# Patient Record
Sex: MALE
[De-identification: names, ages, dates, MRNs, and addresses within clinical notes are randomized per-mention and may not be internally consistent; named-entity substitution may affect disease eponyms.]

---

## 2019-08-21 ENCOUNTER — APPOINTMENT (OUTPATIENT)
Dept: VASCULAR SURGERY | Facility: CLINIC | Age: 64
End: 2019-08-21
Payer: MEDICAID

## 2019-08-21 PROCEDURE — 99203 OFFICE O/P NEW LOW 30 MIN: CPT

## 2019-08-22 PROBLEM — Z00.00 ENCOUNTER FOR PREVENTIVE HEALTH EXAMINATION: Status: ACTIVE | Noted: 2019-08-22

## 2019-10-03 DIAGNOSIS — I73.9 PERIPHERAL VASCULAR DISEASE, UNSPECIFIED: ICD-10-CM

## 2019-10-16 VITALS
WEIGHT: 162.26 LBS | OXYGEN SATURATION: 98 % | HEART RATE: 93 BPM | TEMPERATURE: 98 F | HEIGHT: 64 IN | RESPIRATION RATE: 18 BRPM | DIASTOLIC BLOOD PRESSURE: 73 MMHG | SYSTOLIC BLOOD PRESSURE: 153 MMHG

## 2019-10-16 NOTE — ASU PATIENT PROFILE, ADULT - PSH
Elective surgery  left ear surgery  S/P LASIK surgery  left eye Elective surgery  left ear surgery  H/O circumcision  age 43  S/P LASIK surgery  left eye

## 2019-10-16 NOTE — ASU PATIENT PROFILE, ADULT - PMH
Diabetes    GERD (gastroesophageal reflux disease)    Neuropathy  both feet Claudication of both lower extremities    Diabetes    GERD (gastroesophageal reflux disease)

## 2019-10-16 NOTE — ASU PREOP CHECKLIST - SELECT TESTS ORDERED
Doplar lower extremity/BMP/CBC/PT/PTT/INR/EKG Dopplar lower extremity/POCT Blood Glucose/CBC/INR/EKG/PT/PTT/BMP

## 2019-10-17 ENCOUNTER — APPOINTMENT (OUTPATIENT)
Dept: VASCULAR SURGERY | Facility: HOSPITAL | Age: 64
End: 2019-10-17

## 2019-10-17 ENCOUNTER — OUTPATIENT (OUTPATIENT)
Dept: OUTPATIENT SERVICES | Facility: HOSPITAL | Age: 64
LOS: 1 days | Discharge: HOME | End: 2019-10-17
Payer: MEDICAID

## 2019-10-17 VITALS
SYSTOLIC BLOOD PRESSURE: 127 MMHG | DIASTOLIC BLOOD PRESSURE: 71 MMHG | RESPIRATION RATE: 20 BRPM | HEART RATE: 88 BPM | OXYGEN SATURATION: 100 %

## 2019-10-17 DIAGNOSIS — Z98.890 OTHER SPECIFIED POSTPROCEDURAL STATES: Chronic | ICD-10-CM

## 2019-10-17 DIAGNOSIS — Z41.9 ENCOUNTER FOR PROCEDURE FOR PURPOSES OTHER THAN REMEDYING HEALTH STATE, UNSPECIFIED: Chronic | ICD-10-CM

## 2019-10-17 LAB
GLUCOSE BLDC GLUCOMTR-MCNC: 110 MG/DL — HIGH (ref 70–99)
GLUCOSE BLDC GLUCOMTR-MCNC: 77 MG/DL — SIGNIFICANT CHANGE UP (ref 70–99)
GLUCOSE BLDC GLUCOMTR-MCNC: 90 MG/DL — SIGNIFICANT CHANGE UP (ref 70–99)
GLUCOSE BLDC GLUCOMTR-MCNC: 97 MG/DL — SIGNIFICANT CHANGE UP (ref 70–99)

## 2019-10-17 PROCEDURE — 76000 FLUOROSCOPY <1 HR PHYS/QHP: CPT

## 2019-10-17 PROCEDURE — 37228: CPT | Mod: GC

## 2019-10-17 PROCEDURE — 82962 GLUCOSE BLOOD TEST: CPT

## 2019-10-17 PROCEDURE — C1894: CPT

## 2019-10-17 PROCEDURE — C1887: CPT

## 2019-10-17 PROCEDURE — C1889: CPT

## 2019-10-17 PROCEDURE — 75710 ARTERY X-RAYS ARM/LEG: CPT

## 2019-10-17 PROCEDURE — C1769: CPT

## 2019-10-17 PROCEDURE — C1725: CPT

## 2019-10-17 PROCEDURE — 37228: CPT | Mod: RT

## 2019-10-17 RX ORDER — SODIUM CHLORIDE 9 MG/ML
1000 INJECTION INTRAMUSCULAR; INTRAVENOUS; SUBCUTANEOUS
Refills: 0 | Status: DISCONTINUED | OUTPATIENT
Start: 2019-10-17 | End: 2019-10-17

## 2019-10-17 RX ORDER — ACETAMINOPHEN 500 MG
1000 TABLET ORAL EVERY 6 HOURS
Refills: 0 | Status: DISCONTINUED | OUTPATIENT
Start: 2019-10-17 | End: 2019-10-17

## 2019-10-17 RX ORDER — ATORVASTATIN CALCIUM 80 MG/1
1 TABLET, FILM COATED ORAL
Qty: 90 | Refills: 0
Start: 2019-10-17 | End: 2020-01-14

## 2019-10-17 RX ORDER — ASPIRIN/CALCIUM CARB/MAGNESIUM 324 MG
81 TABLET ORAL ONCE
Refills: 0 | Status: COMPLETED | OUTPATIENT
Start: 2019-10-17 | End: 2019-10-17

## 2019-10-17 RX ORDER — CLOPIDOGREL BISULFATE 75 MG/1
1 TABLET, FILM COATED ORAL
Qty: 90 | Refills: 0
Start: 2019-10-17 | End: 2020-01-14

## 2019-10-17 RX ORDER — INSULIN LISPRO 100/ML
VIAL (ML) SUBCUTANEOUS
Refills: 0 | Status: DISCONTINUED | OUTPATIENT
Start: 2019-10-17 | End: 2019-10-17

## 2019-10-17 RX ORDER — CLOPIDOGREL BISULFATE 75 MG/1
75 TABLET, FILM COATED ORAL ONCE
Refills: 0 | Status: COMPLETED | OUTPATIENT
Start: 2019-10-17 | End: 2019-10-17

## 2019-10-17 RX ADMIN — CLOPIDOGREL BISULFATE 75 MILLIGRAM(S): 75 TABLET, FILM COATED ORAL at 09:40

## 2019-10-17 RX ADMIN — Medication 81 MILLIGRAM(S): at 09:40

## 2019-10-17 RX ADMIN — SODIUM CHLORIDE 75 MILLILITER(S): 9 INJECTION INTRAMUSCULAR; INTRAVENOUS; SUBCUTANEOUS at 10:17

## 2019-10-17 NOTE — BRIEF OPERATIVE NOTE - NSICDXBRIEFPROCEDURE_GEN_ALL_CORE_FT
PROCEDURES:  Angiogram, femoral vessel 17-Oct-2019 09:18:41  Joe Velazquez PROCEDURES:  Angiogram, femoral vessel 17-Oct-2019 09:18:41 RLE, with AT balloon angioplasty Joe Velazquez

## 2019-10-17 NOTE — CHART NOTE - NSCHARTNOTEFT_GEN_A_CORE
Dressing over left groin sheath was removed, sutures were cut, sheath was pulled, pressure was held over sheath site for 20 minutes. Groin was inspected throughout duration pressure was applied for any signs of a hematoma; groin remained soft and nondistended, there was no discoloration. At the end of the 20 minutes, a gauze with tegaderm was applied. No signs of hematoma at that time.

## 2019-10-17 NOTE — BRIEF OPERATIVE NOTE - COMMENTS
6F sheath secured in L groin. Check ACT at 0945 6F sheath secured in L groin. Check ACT at 0945    Pre op Cr 0.9

## 2019-10-17 NOTE — BRIEF OPERATIVE NOTE - OPERATION/FINDINGS
RLE angiogram via L groin access (max sheath size 6F). AT angioplasty using 3 mm x 80 mm [employer] LP balloon. RLE angiogram via L groin access (max sheath size 6F). Aorta and b/l ANGI/EIA/IIA patent. R CFA/PFA/SFA patent. Mild stenosis in AK pop. Rest of pop patent. Below the knee, AT highly stenotic at origin, peroneal occluded, PT patent, AT and PT runoff to foot. AT angioplasty using 3 mm x 80 mm Cook [employer] LP balloon. Completion angiogram showed improved filling of AT.

## 2019-10-25 PROBLEM — I73.9 PERIPHERAL VASCULAR DISEASE, UNSPECIFIED: Chronic | Status: ACTIVE | Noted: 2019-10-17

## 2019-10-25 PROBLEM — K21.9 GASTRO-ESOPHAGEAL REFLUX DISEASE WITHOUT ESOPHAGITIS: Chronic | Status: ACTIVE | Noted: 2019-10-16

## 2019-10-25 PROBLEM — E11.9 TYPE 2 DIABETES MELLITUS WITHOUT COMPLICATIONS: Chronic | Status: ACTIVE | Noted: 2019-10-16

## 2019-11-06 ENCOUNTER — APPOINTMENT (OUTPATIENT)
Dept: VASCULAR SURGERY | Facility: CLINIC | Age: 64
End: 2019-11-06
Payer: MEDICAID

## 2019-11-06 PROCEDURE — 99213 OFFICE O/P EST LOW 20 MIN: CPT
